# Patient Record
Sex: FEMALE | HISPANIC OR LATINO | URBAN - METROPOLITAN AREA
[De-identification: names, ages, dates, MRNs, and addresses within clinical notes are randomized per-mention and may not be internally consistent; named-entity substitution may affect disease eponyms.]

---

## 2018-08-29 ENCOUNTER — OFFICE VISIT (OUTPATIENT)
Dept: URBAN - METROPOLITAN AREA CLINIC 32 | Facility: CLINIC | Age: 68
End: 2018-08-29
Payer: COMMERCIAL

## 2018-08-29 DIAGNOSIS — E11.9 TYPE 2 DIABETES MELLITUS W/O COMPLICATION: ICD-10-CM

## 2018-08-29 DIAGNOSIS — H52.4 PRESBYOPIA: Primary | ICD-10-CM

## 2018-08-29 PROCEDURE — 92015 DETERMINE REFRACTIVE STATE: CPT | Performed by: OPTOMETRIST

## 2018-08-29 PROCEDURE — 92014 COMPRE OPH EXAM EST PT 1/>: CPT | Performed by: OPTOMETRIST

## 2018-08-29 ASSESSMENT — VISUAL ACUITY
OD: 20/20
OS: 20/20

## 2018-08-29 ASSESSMENT — INTRAOCULAR PRESSURE
OS: 17
OD: 16

## 2018-08-29 NOTE — IMPRESSION/PLAN
Impression: Type 2 diabetes mellitus w/o complication: N74.3. OU. Vision: vision not affected. Condition: established, stable. Plan: Discussed diagnosis in detail with patient. Exam shows no active edema or Diabetic changes OU and OCT OD shows ? ILM with Cystic changes OD and OS is wnl. No treatment is required at this time. Advised patient of condition. Recommend a refraction with Dr. Maria Esther Garcia. Emphasized blood sugar control.

## 2020-05-07 ENCOUNTER — OFFICE VISIT (OUTPATIENT)
Dept: URBAN - METROPOLITAN AREA CLINIC 32 | Facility: CLINIC | Age: 70
End: 2020-05-07
Payer: MEDICARE

## 2020-05-07 PROCEDURE — 92134 CPTRZ OPH DX IMG PST SGM RTA: CPT | Performed by: OPTOMETRIST

## 2020-05-07 PROCEDURE — 92014 COMPRE OPH EXAM EST PT 1/>: CPT | Performed by: OPTOMETRIST

## 2020-05-07 ASSESSMENT — INTRAOCULAR PRESSURE
OD: 17
OS: 16

## 2020-05-07 NOTE — IMPRESSION/PLAN
Impression: Type 2 diabetes mellitus w/o complication: F81.1. OU. Vision: vision not affected. Condition: established, stable. Plan: Discussed diagnosis in detail with patient. Exam shows no active edema or Diabetic changes OU and OCT OD shows ? ILM with Cystic changes OD and OS is wnl. No treatment is required at this time. Advised patient of condition. Recommend a refraction with Dr. Filippo Villafuerte. Emphasized blood sugar control.

## 2021-12-04 ENCOUNTER — OFFICE VISIT (OUTPATIENT)
Dept: URBAN - METROPOLITAN AREA CLINIC 32 | Facility: CLINIC | Age: 71
End: 2021-12-04
Payer: MEDICARE

## 2021-12-04 DIAGNOSIS — E11.3211 TYPE 2 DIAB W MILD NONPRLF DIABETIC RTNOP W MACULAR EDEMA, RIGHT EYE: Primary | ICD-10-CM

## 2021-12-04 DIAGNOSIS — H25.13 AGE-RELATED NUCLEAR CATARACT, BILATERAL: ICD-10-CM

## 2021-12-04 DIAGNOSIS — E11.3292 TYPE 2 DIAB W MILD NONPRLF DIABETIC RTNOP W/O MACULAR EDEMA, LEFT EYE: ICD-10-CM

## 2021-12-04 PROCEDURE — 99214 OFFICE O/P EST MOD 30 MIN: CPT | Performed by: OPTOMETRIST

## 2021-12-04 PROCEDURE — 92134 CPTRZ OPH DX IMG PST SGM RTA: CPT | Performed by: OPTOMETRIST

## 2021-12-04 ASSESSMENT — KERATOMETRY
OD: 42.00
OS: 40.88

## 2021-12-04 ASSESSMENT — INTRAOCULAR PRESSURE
OS: 18
OD: 18

## 2021-12-04 ASSESSMENT — VISUAL ACUITY
OD: 20/25
OS: 20/30

## 2021-12-04 NOTE — IMPRESSION/PLAN
Impression: Type 2 diab w mild nonprlf diabetic rtnop w/o macular edema, left eye: E11.3292.  Plan: see plan #1

## 2021-12-04 NOTE — IMPRESSION/PLAN
Impression: Type 2 diab w mild nonprlf diabetic rtnop w macular edema, right eye: [id number]. Plan: Diabetes type II: mild background diabetic retinopathy, no signs of neovascularization noted. No treatment necessary at this time. Patient was instructed to monitor vision for sudden changes and to call if visual changes noted. Discussed ocular and systemic benefits of blood sugar control. MAC-OCT ordered and reviewed.

## 2022-01-31 ENCOUNTER — OFFICE VISIT (OUTPATIENT)
Dept: URBAN - METROPOLITAN AREA CLINIC 32 | Facility: CLINIC | Age: 72
End: 2022-01-31
Payer: MEDICARE

## 2022-01-31 DIAGNOSIS — H25.12 AGE-RELATED NUCLEAR CATARACT, LEFT EYE: ICD-10-CM

## 2022-01-31 DIAGNOSIS — H25.11 AGE-RELATED NUCLEAR CATARACT, RIGHT EYE: Primary | ICD-10-CM

## 2022-01-31 PROCEDURE — 99204 OFFICE O/P NEW MOD 45 MIN: CPT | Performed by: OPHTHALMOLOGY

## 2022-01-31 ASSESSMENT — VISUAL ACUITY
OS: 20/20
OD: 20/20

## 2022-01-31 ASSESSMENT — INTRAOCULAR PRESSURE
OS: 18
OD: 18

## 2022-01-31 ASSESSMENT — KERATOMETRY
OS: 40.75
OD: 41.88

## 2023-08-04 ENCOUNTER — OFFICE VISIT (OUTPATIENT)
Dept: URBAN - METROPOLITAN AREA CLINIC 32 | Facility: CLINIC | Age: 73
End: 2023-08-04
Payer: MEDICARE

## 2023-08-04 DIAGNOSIS — H25.13 AGE-RELATED NUCLEAR CATARACT, BILATERAL: ICD-10-CM

## 2023-08-04 DIAGNOSIS — H52.4 PRESBYOPIA: ICD-10-CM

## 2023-08-04 DIAGNOSIS — E11.3211 TYPE 2 DIAB W MILD NONPRLF DIABETIC RTNOP W MACULAR EDEMA, RIGHT EYE: Primary | ICD-10-CM

## 2023-08-04 PROCEDURE — 99214 OFFICE O/P EST MOD 30 MIN: CPT | Performed by: OPTOMETRIST

## 2023-08-04 PROCEDURE — 92015 DETERMINE REFRACTIVE STATE: CPT | Performed by: OPTOMETRIST

## 2023-08-04 ASSESSMENT — INTRAOCULAR PRESSURE
OS: 17
OD: 17

## 2023-08-04 ASSESSMENT — VISUAL ACUITY
OD: 20/20
OS: 20/20

## 2024-02-20 ENCOUNTER — OFFICE VISIT (OUTPATIENT)
Dept: URBAN - METROPOLITAN AREA CLINIC 32 | Facility: CLINIC | Age: 74
End: 2024-02-20
Payer: MEDICARE

## 2024-02-20 DIAGNOSIS — H16.223 KERATOCONJUNCTIVITIS SICCA, NOT SPECIFIED AS SJ”GREN'S, BILATERAL: ICD-10-CM

## 2024-02-20 DIAGNOSIS — H04.123 DRY EYE SYNDROME OF BILATERAL LACRIMAL GLANDS: ICD-10-CM

## 2024-02-20 DIAGNOSIS — H25.13 AGE-RELATED NUCLEAR CATARACT, BILATERAL: Primary | ICD-10-CM

## 2024-02-20 DIAGNOSIS — H10.45 OTHER CHRONIC ALLERGIC CONJUNCTIVITIS: ICD-10-CM

## 2024-02-20 PROCEDURE — 99214 OFFICE O/P EST MOD 30 MIN: CPT | Performed by: OPTOMETRIST

## 2024-02-20 ASSESSMENT — INTRAOCULAR PRESSURE
OS: 17
OD: 15

## 2024-02-20 ASSESSMENT — VISUAL ACUITY
OD: 20/20
OS: 20/25

## 2024-04-23 ENCOUNTER — OFFICE VISIT (OUTPATIENT)
Dept: URBAN - METROPOLITAN AREA CLINIC 32 | Facility: CLINIC | Age: 74
End: 2024-04-23
Payer: MEDICARE

## 2024-04-23 DIAGNOSIS — H17.89 OTHER CORNEAL SCARS AND OPACITIES: ICD-10-CM

## 2024-04-23 DIAGNOSIS — H25.813 COMBINED FORMS OF AGE-RELATED CATARACT, BILATERAL: Primary | ICD-10-CM

## 2024-04-23 PROCEDURE — 99214 OFFICE O/P EST MOD 30 MIN: CPT | Performed by: OPHTHALMOLOGY

## 2024-04-23 ASSESSMENT — INTRAOCULAR PRESSURE
OS: 17
OD: 18

## 2024-04-23 ASSESSMENT — VISUAL ACUITY
OD: 20/25
OS: 20/25

## 2024-05-13 ENCOUNTER — TECH ONLY (OUTPATIENT)
Dept: URBAN - METROPOLITAN AREA CLINIC 32 | Facility: CLINIC | Age: 74
End: 2024-05-13
Payer: MEDICARE

## 2024-05-13 DIAGNOSIS — H25.11 AGE-RELATED NUCLEAR CATARACT, RIGHT EYE: Primary | ICD-10-CM

## 2024-05-13 ASSESSMENT — PACHYMETRY
OS: 25.46
OS: 3.41
OD: 25.68
OD: 3.44

## 2024-07-06 ENCOUNTER — POST-OPERATIVE VISIT (OUTPATIENT)
Dept: URBAN - METROPOLITAN AREA CLINIC 23 | Facility: CLINIC | Age: 74
End: 2024-07-06

## 2024-07-06 DIAGNOSIS — Z48.810 ENCOUNTER FOR SURGICAL AFTERCARE FOLLOWING SURGERY ON THE SENSE ORGANS: Primary | ICD-10-CM

## 2024-07-06 PROCEDURE — 99024 POSTOP FOLLOW-UP VISIT: CPT

## 2024-07-06 ASSESSMENT — INTRAOCULAR PRESSURE
OS: 18
OD: 17

## 2024-07-12 ENCOUNTER — POST-OPERATIVE VISIT (OUTPATIENT)
Dept: URBAN - METROPOLITAN AREA CLINIC 32 | Facility: CLINIC | Age: 74
End: 2024-07-12
Payer: MEDICARE

## 2024-07-12 DIAGNOSIS — Z48.810 ENCOUNTER FOR SURGICAL AFTERCARE FOLLOWING SURGERY ON A SENSE ORGAN: ICD-10-CM

## 2024-07-12 DIAGNOSIS — H52.4 PRESBYOPIA: Primary | ICD-10-CM

## 2024-07-12 PROCEDURE — 99024 POSTOP FOLLOW-UP VISIT: CPT | Performed by: OPTOMETRIST

## 2024-07-12 RX ORDER — PREDNISOLONE ACETATE 10 MG/ML
1 % SUSPENSION/ DROPS OPHTHALMIC
Qty: 5 | Refills: 1 | Status: ACTIVE
Start: 2024-07-12

## 2024-07-12 RX ORDER — MOXIFLOXACIN 5 MG/ML
0.5 % SOLUTION/ DROPS OPHTHALMIC
Qty: 5 | Refills: 2 | Status: ACTIVE
Start: 2024-07-12

## 2024-07-12 ASSESSMENT — INTRAOCULAR PRESSURE
OD: 16
OS: 10

## 2024-07-12 ASSESSMENT — VISUAL ACUITY
OS: 20/25
OD: 20/25

## 2024-09-14 ENCOUNTER — POST-OPERATIVE VISIT (OUTPATIENT)
Dept: URBAN - METROPOLITAN AREA CLINIC 23 | Facility: CLINIC | Age: 74
End: 2024-09-14
Payer: MEDICARE

## 2024-09-14 DIAGNOSIS — Z96.1 PRESENCE OF INTRAOCULAR LENS: Primary | ICD-10-CM

## 2024-09-14 PROCEDURE — 99024 POSTOP FOLLOW-UP VISIT: CPT | Performed by: OPTOMETRIST

## 2024-09-14 ASSESSMENT — INTRAOCULAR PRESSURE: OD: 19

## 2024-09-24 ENCOUNTER — POST-OPERATIVE VISIT (OUTPATIENT)
Dept: URBAN - METROPOLITAN AREA CLINIC 32 | Facility: CLINIC | Age: 74
End: 2024-09-24
Payer: MEDICARE

## 2024-09-24 DIAGNOSIS — Z96.1 PRESENCE OF INTRAOCULAR LENS: Primary | ICD-10-CM

## 2024-09-24 PROCEDURE — 99024 POSTOP FOLLOW-UP VISIT: CPT | Performed by: OPTOMETRIST

## 2024-09-24 ASSESSMENT — INTRAOCULAR PRESSURE
OD: 10
OS: 10

## 2025-07-09 ENCOUNTER — OFFICE VISIT (OUTPATIENT)
Dept: URBAN - METROPOLITAN AREA CLINIC 32 | Facility: CLINIC | Age: 75
End: 2025-07-09
Payer: MEDICARE

## 2025-07-09 DIAGNOSIS — H04.123 DRY EYE SYNDROME: ICD-10-CM

## 2025-07-09 DIAGNOSIS — E11.3292 TYPE 2 DIAB W MILD NONPRLF DIABETIC RTNOP W/O MACULAR EDEMA, LEFT EYE: ICD-10-CM

## 2025-07-09 DIAGNOSIS — H16.223 KERATOCONJUNCTIVITIS SICCA, NOT SPECIFIED AS SJ”GREN'S, BILATERAL: ICD-10-CM

## 2025-07-09 DIAGNOSIS — H52.4 PRESBYOPIA: ICD-10-CM

## 2025-07-09 DIAGNOSIS — E11.3211 TYPE 2 DIAB W MILD NONPRLF DIABETIC RTNOP W MACULAR EDEMA, RIGHT EYE: Primary | ICD-10-CM

## 2025-07-09 PROCEDURE — 92134 CPTRZ OPH DX IMG PST SGM RTA: CPT | Performed by: OPTOMETRIST

## 2025-07-09 PROCEDURE — 99214 OFFICE O/P EST MOD 30 MIN: CPT | Performed by: OPTOMETRIST

## 2025-07-09 ASSESSMENT — VISUAL ACUITY
OS: 20/20
OD: 20/20

## 2025-07-09 ASSESSMENT — KERATOMETRY: OD: 42.25

## 2025-07-09 ASSESSMENT — INTRAOCULAR PRESSURE
OS: 10
OD: 10